# Patient Record
Sex: FEMALE | HISPANIC OR LATINO | URBAN - METROPOLITAN AREA
[De-identification: names, ages, dates, MRNs, and addresses within clinical notes are randomized per-mention and may not be internally consistent; named-entity substitution may affect disease eponyms.]

---

## 2018-06-28 ENCOUNTER — OFFICE VISIT (OUTPATIENT)
Dept: URBAN - METROPOLITAN AREA CLINIC 11 | Facility: CLINIC | Age: 63
End: 2018-06-28
Payer: MEDICAID

## 2018-06-28 DIAGNOSIS — H25.813 COMBINED FORMS OF AGE-RELATED CATARACT, BILATERAL: ICD-10-CM

## 2018-06-28 PROCEDURE — 99214 OFFICE O/P EST MOD 30 MIN: CPT | Performed by: OPHTHALMOLOGY

## 2018-06-28 ASSESSMENT — KERATOMETRY
OD: 44.75
OS: 45.13

## 2018-06-28 ASSESSMENT — INTRAOCULAR PRESSURE
OD: 12
OS: 12

## 2018-06-28 ASSESSMENT — VISUAL ACUITY
OS: 20/70
OD: 20/150

## 2018-06-28 NOTE — IMPRESSION/PLAN
Impression: Degenerative myopia, bilateral: H44.23. Vision: vision affected. Plan: Discussed diagnosis in detail with patient. Advised patient of condition. Consult recommended [[facility]  Will refer to Dr. Natali Covington for further eval.

## 2018-06-28 NOTE — IMPRESSION/PLAN
Impression: Combined forms of age-related cataract, bilateral: H25.813. Plan: Advised pt cataract surgery may not benefit [facility] at this point due to myopic degeneration. Cataracts appear mild.  Dr. Mahamed Kapadia to determine if CE IOL would be beneficial.

## 2018-07-26 ENCOUNTER — OFFICE VISIT (OUTPATIENT)
Dept: URBAN - METROPOLITAN AREA CLINIC 11 | Facility: CLINIC | Age: 63
End: 2018-07-26
Payer: MEDICAID

## 2018-07-26 DIAGNOSIS — H44.23 DEGENERATIVE MYOPIA, BILATERAL: Primary | ICD-10-CM

## 2018-07-26 PROCEDURE — 92134 CPTRZ OPH DX IMG PST SGM RTA: CPT | Performed by: OPHTHALMOLOGY

## 2018-07-26 PROCEDURE — 99213 OFFICE O/P EST LOW 20 MIN: CPT | Performed by: OPHTHALMOLOGY

## 2018-07-26 ASSESSMENT — INTRAOCULAR PRESSURE
OS: 16
OD: 16

## 2018-07-26 NOTE — IMPRESSION/PLAN
Impression: Degenerative myopia, bilateral: H44.23. OU. Condition: established, stable. Plan: OCT ordered and discussed Discussed diagnosis in detail with patient. Advised patient of condition. Will continue to observe condition and or symptoms.  Patient agrees with plan

## 2018-07-26 NOTE — IMPRESSION/PLAN
Impression: Combined forms of age-related cataract, bilateral: H25.813. OU. Plan: OCT ordered and discussed. Discussed diagnosis in detail with patient. Discussed treatment options with patient. Advised patient of condition.   No retinal contraindication to CE IOL Patient wishes to have her OD done first. Advised patient that cataract surgery may not improve visual acuity

## 2018-08-24 ENCOUNTER — PRE-OPERATIVE VISIT (OUTPATIENT)
Dept: URBAN - METROPOLITAN AREA CLINIC 11 | Facility: CLINIC | Age: 63
End: 2018-08-24
Payer: MEDICAID

## 2018-08-24 PROCEDURE — 76519 ECHO EXAM OF EYE: CPT | Performed by: OPHTHALMOLOGY

## 2018-08-24 ASSESSMENT — PACHYMETRY
OS: 3.30
OD: 32.80
OS: 33.48
OD: 3.30